# Patient Record
Sex: FEMALE | Race: BLACK OR AFRICAN AMERICAN | NOT HISPANIC OR LATINO | URBAN - METROPOLITAN AREA
[De-identification: names, ages, dates, MRNs, and addresses within clinical notes are randomized per-mention and may not be internally consistent; named-entity substitution may affect disease eponyms.]

---

## 2023-11-26 ENCOUNTER — APPOINTMENT (OUTPATIENT)
Dept: RADIOLOGY | Facility: HOSPITAL | Age: 48
End: 2023-11-26
Payer: COMMERCIAL

## 2023-11-26 ENCOUNTER — HOSPITAL ENCOUNTER (EMERGENCY)
Facility: HOSPITAL | Age: 48
Discharge: HOME | End: 2023-11-26
Attending: INTERNAL MEDICINE
Payer: COMMERCIAL

## 2023-11-26 VITALS
HEIGHT: 66 IN | OXYGEN SATURATION: 97 % | BODY MASS INDEX: 34.55 KG/M2 | SYSTOLIC BLOOD PRESSURE: 138 MMHG | WEIGHT: 215 LBS | RESPIRATION RATE: 18 BRPM | DIASTOLIC BLOOD PRESSURE: 74 MMHG | TEMPERATURE: 98.2 F | HEART RATE: 74 BPM

## 2023-11-26 DIAGNOSIS — K29.70 GASTRITIS WITHOUT BLEEDING, UNSPECIFIED CHRONICITY, UNSPECIFIED GASTRITIS TYPE: Primary | ICD-10-CM

## 2023-11-26 LAB
ALBUMIN SERPL BCP-MCNC: 3.6 G/DL (ref 3.4–5)
ALP SERPL-CCNC: 97 U/L (ref 33–110)
ALT SERPL W P-5'-P-CCNC: 30 U/L (ref 7–45)
ANION GAP SERPL CALC-SCNC: 8 MMOL/L (ref 10–20)
APPEARANCE UR: CLEAR
AST SERPL W P-5'-P-CCNC: 14 U/L (ref 9–39)
BASOPHILS # BLD AUTO: 0.05 X10*3/UL (ref 0–0.1)
BASOPHILS NFR BLD AUTO: 0.6 %
BILIRUB SERPL-MCNC: 0.4 MG/DL (ref 0–1.2)
BILIRUB UR STRIP.AUTO-MCNC: NEGATIVE MG/DL
BUN SERPL-MCNC: 23 MG/DL (ref 6–23)
CALCIUM SERPL-MCNC: 8 MG/DL (ref 8.6–10.3)
CHLORIDE SERPL-SCNC: 105 MMOL/L (ref 98–107)
CO2 SERPL-SCNC: 27 MMOL/L (ref 21–32)
COLOR UR: YELLOW
CREAT SERPL-MCNC: 1 MG/DL (ref 0.5–1.05)
EOSINOPHIL # BLD AUTO: 0.07 X10*3/UL (ref 0–0.7)
EOSINOPHIL NFR BLD AUTO: 0.8 %
ERYTHROCYTE [DISTWIDTH] IN BLOOD BY AUTOMATED COUNT: 13.9 % (ref 11.5–14.5)
GFR SERPL CREATININE-BSD FRML MDRD: 70 ML/MIN/1.73M*2
GLUCOSE SERPL-MCNC: 78 MG/DL (ref 74–99)
GLUCOSE UR STRIP.AUTO-MCNC: NEGATIVE MG/DL
HCG UR QL IA.RAPID: NEGATIVE
HCT VFR BLD AUTO: 40.2 % (ref 36–46)
HGB BLD-MCNC: 13.2 G/DL (ref 12–16)
HOLD SPECIMEN: NORMAL
IMM GRANULOCYTES # BLD AUTO: 0.03 X10*3/UL (ref 0–0.7)
IMM GRANULOCYTES NFR BLD AUTO: 0.3 % (ref 0–0.9)
KETONES UR STRIP.AUTO-MCNC: NEGATIVE MG/DL
LACTATE SERPL-SCNC: 0.6 MMOL/L (ref 0.4–2)
LEUKOCYTE ESTERASE UR QL STRIP.AUTO: NEGATIVE
LIPASE SERPL-CCNC: 21 U/L (ref 9–82)
LYMPHOCYTES # BLD AUTO: 3.63 X10*3/UL (ref 1.2–4.8)
LYMPHOCYTES NFR BLD AUTO: 40 %
MCH RBC QN AUTO: 30 PG (ref 26–34)
MCHC RBC AUTO-ENTMCNC: 32.8 G/DL (ref 32–36)
MCV RBC AUTO: 91 FL (ref 80–100)
MONOCYTES # BLD AUTO: 0.52 X10*3/UL (ref 0.1–1)
MONOCYTES NFR BLD AUTO: 5.7 %
NEUTROPHILS # BLD AUTO: 4.78 X10*3/UL (ref 1.2–7.7)
NEUTROPHILS NFR BLD AUTO: 52.6 %
NITRITE UR QL STRIP.AUTO: NEGATIVE
NRBC BLD-RTO: 0 /100 WBCS (ref 0–0)
PH UR STRIP.AUTO: 5 [PH]
PLATELET # BLD AUTO: 373 X10*3/UL (ref 150–450)
POTASSIUM SERPL-SCNC: 3.2 MMOL/L (ref 3.5–5.3)
PROT SERPL-MCNC: 5.9 G/DL (ref 6.4–8.2)
PROT UR STRIP.AUTO-MCNC: NEGATIVE MG/DL
RBC # BLD AUTO: 4.4 X10*6/UL (ref 4–5.2)
RBC # UR STRIP.AUTO: NEGATIVE /UL
SARS-COV-2 RNA RESP QL NAA+PROBE: NOT DETECTED
SODIUM SERPL-SCNC: 137 MMOL/L (ref 136–145)
SP GR UR STRIP.AUTO: 1.02
UROBILINOGEN UR STRIP.AUTO-MCNC: 2 MG/DL
WBC # BLD AUTO: 9.1 X10*3/UL (ref 4.4–11.3)

## 2023-11-26 PROCEDURE — 81003 URINALYSIS AUTO W/O SCOPE: CPT | Performed by: INTERNAL MEDICINE

## 2023-11-26 PROCEDURE — 96375 TX/PRO/DX INJ NEW DRUG ADDON: CPT

## 2023-11-26 PROCEDURE — 74176 CT ABD & PELVIS W/O CONTRAST: CPT | Mod: FR

## 2023-11-26 PROCEDURE — 2500000001 HC RX 250 WO HCPCS SELF ADMINISTERED DRUGS (ALT 637 FOR MEDICARE OP): Performed by: INTERNAL MEDICINE

## 2023-11-26 PROCEDURE — 96374 THER/PROPH/DIAG INJ IV PUSH: CPT

## 2023-11-26 PROCEDURE — 96376 TX/PRO/DX INJ SAME DRUG ADON: CPT

## 2023-11-26 PROCEDURE — 80053 COMPREHEN METABOLIC PANEL: CPT | Performed by: INTERNAL MEDICINE

## 2023-11-26 PROCEDURE — 85025 COMPLETE CBC W/AUTO DIFF WBC: CPT | Performed by: INTERNAL MEDICINE

## 2023-11-26 PROCEDURE — 87635 SARS-COV-2 COVID-19 AMP PRB: CPT | Performed by: INTERNAL MEDICINE

## 2023-11-26 PROCEDURE — 83690 ASSAY OF LIPASE: CPT | Performed by: INTERNAL MEDICINE

## 2023-11-26 PROCEDURE — 36415 COLL VENOUS BLD VENIPUNCTURE: CPT | Performed by: INTERNAL MEDICINE

## 2023-11-26 PROCEDURE — 2500000004 HC RX 250 GENERAL PHARMACY W/ HCPCS (ALT 636 FOR OP/ED): Performed by: INTERNAL MEDICINE

## 2023-11-26 PROCEDURE — 74176 CT ABD & PELVIS W/O CONTRAST: CPT | Mod: FOREIGN READ | Performed by: RADIOLOGY

## 2023-11-26 PROCEDURE — 2500000004 HC RX 250 GENERAL PHARMACY W/ HCPCS (ALT 636 FOR OP/ED)

## 2023-11-26 PROCEDURE — 81025 URINE PREGNANCY TEST: CPT | Performed by: INTERNAL MEDICINE

## 2023-11-26 PROCEDURE — 83605 ASSAY OF LACTIC ACID: CPT | Performed by: INTERNAL MEDICINE

## 2023-11-26 PROCEDURE — 99284 EMERGENCY DEPT VISIT MOD MDM: CPT | Mod: 25 | Performed by: INTERNAL MEDICINE

## 2023-11-26 RX ORDER — ONDANSETRON 4 MG/1
4 TABLET, ORALLY DISINTEGRATING ORAL EVERY 8 HOURS PRN
Qty: 15 TABLET | Refills: 0 | Status: SHIPPED | OUTPATIENT
Start: 2023-11-26

## 2023-11-26 RX ORDER — MORPHINE SULFATE 4 MG/ML
4 INJECTION, SOLUTION INTRAMUSCULAR; INTRAVENOUS ONCE
Status: COMPLETED | OUTPATIENT
Start: 2023-11-26 | End: 2023-11-26

## 2023-11-26 RX ORDER — ONDANSETRON HYDROCHLORIDE 2 MG/ML
4 INJECTION, SOLUTION INTRAVENOUS ONCE
Status: COMPLETED | OUTPATIENT
Start: 2023-11-26 | End: 2023-11-26

## 2023-11-26 RX ORDER — FAMOTIDINE 20 MG/1
20 TABLET, FILM COATED ORAL 2 TIMES DAILY PRN
Qty: 30 TABLET | Refills: 0 | Status: SHIPPED | OUTPATIENT
Start: 2023-11-26 | End: 2023-12-11

## 2023-11-26 RX ORDER — DICYCLOMINE HYDROCHLORIDE 20 MG/1
20 TABLET ORAL 2 TIMES DAILY PRN
Qty: 20 TABLET | Refills: 0 | Status: SHIPPED | OUTPATIENT
Start: 2023-11-26 | End: 2023-12-06

## 2023-11-26 RX ORDER — ONDANSETRON HYDROCHLORIDE 2 MG/ML
INJECTION, SOLUTION INTRAVENOUS
Status: COMPLETED
Start: 2023-11-26 | End: 2023-11-26

## 2023-11-26 RX ORDER — FAMOTIDINE 10 MG/ML
20 INJECTION INTRAVENOUS ONCE
Status: COMPLETED | OUTPATIENT
Start: 2023-11-26 | End: 2023-11-26

## 2023-11-26 RX ORDER — ALUMINUM HYDROXIDE, MAGNESIUM HYDROXIDE, AND SIMETHICONE 1200; 120; 1200 MG/30ML; MG/30ML; MG/30ML
30 SUSPENSION ORAL ONCE
Status: COMPLETED | OUTPATIENT
Start: 2023-11-26 | End: 2023-11-26

## 2023-11-26 RX ORDER — POTASSIUM CHLORIDE 1.5 G/1.58G
40 POWDER, FOR SOLUTION ORAL ONCE
Status: COMPLETED | OUTPATIENT
Start: 2023-11-26 | End: 2023-11-26

## 2023-11-26 RX ADMIN — ONDANSETRON HYDROCHLORIDE 4 MG: 2 INJECTION, SOLUTION INTRAVENOUS at 21:17

## 2023-11-26 RX ADMIN — MORPHINE SULFATE 4 MG: 4 INJECTION, SOLUTION INTRAMUSCULAR; INTRAVENOUS at 17:42

## 2023-11-26 RX ADMIN — ONDANSETRON 4 MG: 2 INJECTION INTRAMUSCULAR; INTRAVENOUS at 17:13

## 2023-11-26 RX ADMIN — MORPHINE SULFATE 4 MG: 4 INJECTION, SOLUTION INTRAMUSCULAR; INTRAVENOUS at 20:04

## 2023-11-26 RX ADMIN — FAMOTIDINE 20 MG: 10 INJECTION, SOLUTION INTRAVENOUS at 17:12

## 2023-11-26 RX ADMIN — ONDANSETRON 4 MG: 2 INJECTION INTRAMUSCULAR; INTRAVENOUS at 21:17

## 2023-11-26 RX ADMIN — ALUMINUM HYDROXIDE, MAGNESIUM HYDROXIDE, AND DIMETHICONE 30 ML: 200; 20; 200 SUSPENSION ORAL at 17:12

## 2023-11-26 RX ADMIN — POTASSIUM CHLORIDE 40 MEQ: 1.5 POWDER, FOR SOLUTION ORAL at 17:41

## 2023-11-26 ASSESSMENT — PAIN DESCRIPTION - LOCATION: LOCATION: ABDOMEN

## 2023-11-26 ASSESSMENT — COLUMBIA-SUICIDE SEVERITY RATING SCALE - C-SSRS
2. HAVE YOU ACTUALLY HAD ANY THOUGHTS OF KILLING YOURSELF?: NO
6. HAVE YOU EVER DONE ANYTHING, STARTED TO DO ANYTHING, OR PREPARED TO DO ANYTHING TO END YOUR LIFE?: NO
1. IN THE PAST MONTH, HAVE YOU WISHED YOU WERE DEAD OR WISHED YOU COULD GO TO SLEEP AND NOT WAKE UP?: NO

## 2023-11-26 ASSESSMENT — PAIN SCALES - GENERAL
PAINLEVEL_OUTOF10: 10 - WORST POSSIBLE PAIN
PAINLEVEL_OUTOF10: 9
PAINLEVEL_OUTOF10: 8

## 2023-11-26 ASSESSMENT — PAIN - FUNCTIONAL ASSESSMENT
PAIN_FUNCTIONAL_ASSESSMENT: 0-10
PAIN_FUNCTIONAL_ASSESSMENT: 0-10

## 2023-11-26 NOTE — ED TRIAGE NOTES
"Pt arrived to triage for abdominal pain that started earlier today. Pt sts n w/o v. Pt denies constipation of d. Pt describes the pain as \"intestines tangled\". No obvious signs of distress noted at this time. VSS at this time. EKG complete in triage.   "

## 2023-11-26 NOTE — ED PROVIDER NOTES
"HPI     CC: Abdominal Pain     HPI: Kamini Garza is a 48 y.o. female with a history of Jaqueline-en-Y gastric bypass, cholecystectomy, asthma, presents with upper abdominal pain.  Symptoms started today.  She feels a knot-like pain in her left upper quadrant that then shoots to the right upper quadrant.  She had some associated nausea but no vomiting.  Symptoms started after she ate.  She does take a daily PPI but has not tried any other pain meds.  She denies any lower abdominal pain, diarrhea, dysuria or hematuria.  Last bowel movement was this morning and it was normal.    ROS: 10-point review of systems was performed and is otherwise negative except as noted in HPI.    Limitations to history: N/A    Independent Historians: N/A    External Records Reviewed: N/A    Past Medical History: Noncontributory except per HPI     Past Surgical History: Noncontributory except per HPI     Family History: Reviewed and noncontributory     Social History:  Denies tobacco. Denies ETOH. Denies illicit drugs.    Social Determinants Affecting Care: N/A    Allergies   Allergen Reactions    Iodinated Contrast Media Anaphylaxis    Percolone [Oxycodone] Hives    Sudafed [Pseudoephedrine Hcl] Hives    Tramadol Hives       Home Meds:   None documented     Physical Exam     ED Triage Vitals [11/26/23 1622]   Temp Heart Rate Resp BP   36.8 °C (98.2 °F) 85 17 (!) 148/92      SpO2 Temp Source Heart Rate Source Patient Position   100 % Temporal -- Sitting      BP Location FiO2 (%)     Right arm --         Heart Rate:  [74-85]   Temp:  [36.8 °C (98.2 °F)]   Resp:  [15-17]   BP: (146-154)/(85-92)   Height:  [167.6 cm (5' 6\")]   Weight:  [97.5 kg (215 lb)]   SpO2:  [96 %-100 %]      Physical Exam  Vitals and nursing note reviewed.     CONSTITUTIONAL: Well appearing, well nourished, in no acute distress.   HENMT: Head atraumatic. Airway patent. Nasal mucosa clear. Mouth with normal mucosa, clear oropharynx. Uvula midline. Neck supple.    EYES: Clear " bilaterally, pupils equally round and reactive to light.   CARDIOVASCULAR: Normal rate, regular rhythm.  Heart sounds S1, S2.  No murmurs, rubs or gallops. Normal pulses. Capillary refill < 2 sec.   RESPIRATORY: No increased work of breathing. Breath sounds clear and equal bilaterally.  GASTROINTESTINAL: Abdomen soft, non-distended, +tender to palpation in the LUQ and RUQ with voluntary guarding. No rebound. Normal bowel sounds. No palpable masses.  GENITOURINARY:  No CVA tenderness.  MUSCULOSKELETAL: Spine appears normal, range of motion is not limited, no muscle or joint tenderness. No edema.   NEUROLOGICAL: Alert and oriented, no asymmetry, moving all extremities equally.  SKIN: Warm, dry and intact. No rash or notable lesions.  PSYCHIATRIC: Normal mood and affect.  HEME/LYMPH: No adenopathy or splenomegaly.    Diagnostic Results      ECG: ECGs read and interpreted by me. See ED Course, below, for interpretation.    Labs Reviewed   COMPREHENSIVE METABOLIC PANEL - Abnormal       Result Value    Glucose 78      Sodium 137      Potassium 3.2 (*)     Chloride 105      Bicarbonate 27      Anion Gap 8 (*)     Urea Nitrogen 23      Creatinine 1.00      eGFR 70      Calcium 8.0 (*)     Albumin 3.6      Alkaline Phosphatase 97      Total Protein 5.9 (*)     AST 14      Bilirubin, Total 0.4      ALT 30     URINALYSIS WITH REFLEX MICROSCOPIC AND CULTURE - Abnormal    Color, Urine Yellow      Appearance, Urine Clear      Specific Gravity, Urine 1.017      pH, Urine 5.0      Protein, Urine NEGATIVE      Glucose, Urine NEGATIVE      Blood, Urine NEGATIVE      Ketones, Urine NEGATIVE      Bilirubin, Urine NEGATIVE      Urobilinogen, Urine 2.0 (*)     Nitrite, Urine NEGATIVE      Leukocyte Esterase, Urine NEGATIVE     LIPASE - Normal    Lipase 21      Narrative:     Venipuncture immediately after or during the administration of Metamizole may lead to falsely low results. Testing should be performed immediately prior to  Metamizole dosing.   LACTATE - Normal    Lactate 0.6      Narrative:     Venipuncture immediately after or during the administration of Metamizole may lead to falsely low results. Testing should be performed immediately  prior to Metamizole dosing.   SARS-COV-2 PCR, SYMPTOMATIC - Normal    Coronavirus 2019, PCR Not Detected      Narrative:     This assay has received FDA Emergency Use Authorization (EUA) and is only authorized for the duration of time that circumstances exist to justify the authorization of the emergency use of in vitro diagnostic tests for the detection of SARS-CoV-2 virus and/or diagnosis of COVID-19 infection under section 564(b)(1) of the Act, 21 U.S.C. 360bbb-3(b)(1). This assay is an in vitro diagnostic nucleic acid amplification test for the qualitative detection of SARS-CoV-2 from nasopharyngeal specimens and has been validated for use at [facility]. Negative results do not preclude COVID-19 infections and should not be used as the sole basis for diagnosis, treatment, or other management decisions.     HCG, URINE, QUALITATIVE - Normal    HCG, Urine NEGATIVE     CBC WITH AUTO DIFFERENTIAL    WBC 9.1      nRBC 0.0      RBC 4.40      Hemoglobin 13.2      Hematocrit 40.2      MCV 91      MCH 30.0      MCHC 32.8      RDW 13.9      Platelets 373      Neutrophils % 52.6      Immature Granulocytes %, Automated 0.3      Lymphocytes % 40.0      Monocytes % 5.7      Eosinophils % 0.8      Basophils % 0.6      Neutrophils Absolute 4.78      Immature Granulocytes Absolute, Automated 0.03      Lymphocytes Absolute 3.63      Monocytes Absolute 0.52      Eosinophils Absolute 0.07      Basophils Absolute 0.05     URINALYSIS WITH REFLEX MICROSCOPIC AND CULTURE    Narrative:     The following orders were created for panel order Urinalysis with Reflex Microscopic and Culture.  Procedure                               Abnormality         Status                     ---------                                -----------         ------                     Urinalysis with Reflex Mi...[[id number]]  Abnormal            Final result               Extra Urine Gray Tube[[id number]]                             Final result                 Please view results for these tests on the individual orders.   EXTRA URINE GRAY TUBE    Extra Tube Hold for add-ons.           CT abdomen pelvis wo IV contrast   Final Result   No acute process identified involving the abdomen or pelvis.   Signed by Jefry Pearson MD                    Bianca Coma Scale Score: 15                  Procedure  Procedures    ED Course & MDM   Assessment/Plan:   Kamini Garza is a 48 y.o. female with a history of Jaqueline-en-Y gastric bypass, cholecystectomy, asthma, presents with upper abdominal pain.  She is focally tender to palpation in the left and right upper quadrants.  She has some associated nausea but no vomiting.  She is having normal bowel movements today.  She is hemodynamically stable except for mild hypertension.  Differential includes bowel obstruction given history of gastric bypass, gastritis/PUD given location in LUQ and onset after eating, hepatitis, pancreatitis or other occult intra-abdominal process.  Patient is s/p cholecystectomy ruling out acute cholecystitis.  No lower abdominal pain to suggest appendicitis, UTI, or acute pelvic process.  Work-up was initiated with labs, UA, CTAP.  Treatment was initiated with IV Zofran, Pepcid and Maalox. See below for details of ED course and ultimate disposition.    Medications   famotidine PF (Pepcid) injection 20 mg (20 mg intravenous Given 11/26/23 1712)   alum-mag hydroxide-simeth (Mylanta) 200-200-20 mg/5 mL oral suspension 30 mL (30 mL oral Given 11/26/23 1712)   ondansetron (Zofran) injection 4 mg (4 mg intravenous Given 11/26/23 1713)   morphine injection 4 mg (4 mg intravenous Given 11/26/23 1742)   potassium chloride (Klor-Con) packet 40 mEq (40 mEq oral Given 11/26/23 1741)    morphine injection 4 mg (4 mg intravenous Given 11/26/23 2004)        ED Course as of 11/26/23 2108   [place] Nov 26, 2023   1758 Labs notable for normal CBC, CMP with mild hypokalemia 3.2 otherwise unremarkable, normal lipase, normal lactate, negative beta hCG, negative COVID swab.  UA negative for UTI.  Potassium was repleted with p.o. KCl.  Morphine given for pain. [CG]   1808 UA unremarkable [CG]   2100 ECG: ECG read and interpreted by me. Normal sinus rhythm, rate 78. Normal axis. Normal intervals. No ST or T wave abnormalities. [CG]   2100 CTAP IMPRESSION:  No acute process identified involving the abdomen or pelvis.   [CG]   2100 Patient feeling better.  Tolerating p.o.  Will discharge home with Zofran, Bentyl, and Pepcid.  Patient was discharged home with anticipatory guidance and strict return precautions.  She was given the number for GI to call for possible EGD if symptoms recur. [CG]      ED Course User Index  [CG] Raine Barraza MD         Diagnoses as of 11/26/23 2108   Gastritis without bleeding, unspecified chronicity, unspecified gastritis type       Disposition:   DISCHARGE.  The patient was discharged with both verbal and written anticipatory guidance and strict return precautions. Discharge diagnosis, instructions and plan were discussed and understood. I emphasized the importance of following up with their primary care provider within 24-48 hours and with any referrals in the timeframe recommended. At the time of discharge the patient was comfortable and was in no apparent distress. Patient is aware of diagnostic uncertainty and was notified though testing is negative here, there is a very small chance that pathology may be missed.  The patient understands these risks and the patient/family understood to call 911 or return immediately to the emergency department if the symptoms worsen or if they have any additional concerns.      FOLLOW UP  Primary care provider in 1-2 days.      ED  Prescriptions       Medication Sig Dispense Start Date End Date Auth. Provider    famotidine (Pepcid) 20 mg tablet Take 1 tablet (20 mg) by mouth 2 times a day as needed (abdominal pain) for up to 15 days. 30 tablet 11/26/2023 12/11/2023 Raine Barraza MD    dicyclomine (Bentyl) 20 mg tablet Take 1 tablet (20 mg) by mouth 2 times a day as needed (abdominal cramping) for up to 10 days. 20 tablet 11/26/2023 12/6/2023 Raine Barraza MD    ondansetron ODT (Zofran-ODT) 4 mg disintegrating tablet Take 1 tablet (4 mg) by mouth every 8 hours if needed for nausea or vomiting. 15 tablet 11/26/2023 -- MD Raine Albert MD  EM/IM/Peds    This note was dictated by speech recognition. Minor errors in transcription may be present.     Raine Barraza MD  11/26/23 1539

## 2023-11-27 NOTE — DISCHARGE INSTRUCTIONS
You were seen today for upper abdominal pain.  You may have gastritis.  Your evaluation was not concerning for an emergency at this time.  We gave you a prescription for an antispasmodic, and antinausea medicine, and an antacid.  Continue your omeprazole at home.  If your symptoms recur, consider seeing a GI specialist or returning to the ED.  Please see the attached information sheet for information about your condition, how to care for your condition at home, and reasons to return to the emergency department. Take any prescriptions written today as prescribed. You should call your primary care provider within 24 hours to tell them about today's visit, including any new medications or medication changes, as he or she may want to see you in the office for further evaluation. If you do not have a primary care provider, call  [phone] for an appointment. We offer in-person office visits as well as virtual options. Please do not hesitate to call  [phone] or return to the emergency department with any new or unresolved concerns or symptoms. Thank you for choosing [facility] for your care.

## 2024-01-19 NOTE — PROGRESS NOTES
BARIATRIC SURGERY CLINIC  1 YEAR/ANNUAL FOLLOW UP NOTE    Clinic Date:  1/24/24    Kamini Garza, 48 y.o.  MRN: [id number]    Date of Surgery: 10/3/17   Surgical Procedure: Laparoscopic lauro en y gastric bypass 83275  Extra Procedures: None    Date of Surgery: 1/9/2020  Surgical Procedure: Laparoscopic redo hiatal hernia repair and revision of  gastrojejunostomy and upper endoscopy.    Initial weight: 272 lb  Pre-surgical weight: 209 lb  Today's Visit:   Wt Readings from Last 1 Encounters:   11/26/23 97.5 kg (215 lb)    There is no height or weight on file to calculate BMI.   Total weight loss: at 228  Stomach been hurting lately.  Started in November.  She has been dealing with it.  She is taking omeprazole. Notes the pain in ruq.  Takes tylenol.   She likes eating snacks and chips. Chips don't change it.     Surgeon: Miladys Hook MD      PROVIDER IMPRESSIONS LAST FUV 10/2020: 46 yo female 3 years post rygbp and had prior lala. Se also had HH repair. She was admitted with abd pain and elevated transaminases and high alk phos. This was suggestive of biliary disease, but ct, mrcp and u/s all normal.     CT Abdomen/Pelvis 11/26/23:   INDICATION: Upper abdominal pain.  LOWER CHEST:  No cardiomegaly.  No pericardial effusion.  Lung bases are clear.  ABDOMEN:  LIVER:  No hepatomegaly.  Smooth surface contour.  Normal attenuation.  BILE DUCTS:  No intrahepatic or extrahepatic biliary ductal dilatation.  GALLBLADDER:  The gallbladder is surgically absent.  STOMACH:  No abnormalities identified.  PANCREAS:  No masses or ductal dilatation.  SPLEEN:  No splenomegaly or focal splenic lesion.  ADRENAL GLANDS:  No thickening or nodules.  KIDNEYS AND URETERS:  Kidneys are normal in size and location.  No renal or ureteral  calculi.  PELVIS:  BLADDER:  No abnormalities identified.  REPRODUCTIVE ORGANS:  No abnormalities identified.  BOWEL:  Changes of Lauro-en-Y gastric bypass. Otherwise unremarkable.  VESSELS:  No abnormalities  identified.  Abdominal aorta is normal in caliber.   PERITONEUM/RETROPERITONEUM/LYMPH NODES:  No free fluid.  No pneumoperitoneum.  No lymphadenopathy.  ABDOMINAL WALL:  No abnormalities identified.  SOFT TISSUES:   No abnormalities identified.  BONES:  No acute fracture or aggressive osseous lesion.  IMPRESSION:  No acute process identified involving the abdomen or pelvis.      PRESENTING TODAY FOR BARIATRIC POST-OP VISIT:  Visit: 7  years   -  Self Reports Concerns:  nausea and vomiting, abdominal pain.  Symptoms:    Abdominal pain:  Yes            Constipation: No    Diarrhea: No    Dumping Syndrome:  No    Experiencing hunger: Yes    Food Intolerances: No    Nausea/vomiting: Yes    Reflux: Yes   Are you on medications: Yes      Diet History:     Carbonated Beverages: No          Caffeinated Beverages: Yes    Time to eat meals: all day, only eats small portions, snacking    Fluid intake: 50-60 oz/day      Protein Intake:  50 grams/day    Breakfast: eggs, helm    Lunch: salad or half sandwich    Dinner: salmon, vegetables    Snacks: cookies, chips.    EXERCISE:  Yes- walking          GERD - Health Related Quality of Life Questionnaire (GERD- HRQL)  On PPI    How bad is the heartburn? 0 = No symptoms  Heartburn when lying down? 0 = No symptoms  Heartburn when standing up? 0 = No symptoms  Heartburn after meals? 0 = No symptoms  Does heartburn change your diet? 0 = No symptoms  Does heartburn wake you from sleep? 0 = No symptoms    Do you have difficulty swallowing? 0 = No symptoms  Do you have pain with swallowing? 0 = No symptoms  If you take medication, does this affect your daily life? 0 = No symptoms    How bad is the regurgitation? 3 = Symptoms bothersome every day  Regurgitation when lying down? 3 = Symptoms bothersome every day  Regurgitation when standing up? 1 = Symptoms noticeable but not bothersome  Regurgitation after meals? 1 = Symptoms noticeable but not bothersome  Does regurgitation change your  diet? 0 = No symptoms  Does regurgitation wake you from sleep? 3 = Symptoms bothersome every day    How satisfied are you with your present condition? Dissatisfied    Total score (calculated by summing the individual scores of questions 1-15): 11   Greatest possible score 75 (worst symptoms).   Lowest possible score 0 (no symptoms).    Heartburn score (calculated by summing the individual scores of questions 1-6): 0   Worst heartburn symptoms: 30.   No heartburn symptoms: 0.   Score less than or equal to 12 with each individual question not exceeding 2 indicate heartburn elimination.    Regurgitation score (calculated by summing the individual scores of questions 10-15): 11   Worst regurgitation symptoms: 30.   No regurgitation symptoms: 0.   Score less than or equal to 12 with each individual question not exceeding 2 indicate regurgitation elimination.     Last Visit:    Wt Readings from Last 3 Encounters:   11/26/23 97.5 kg (215 lb)   07/07/22 104 kg (230 lb)   02/19/20 90 kg (198 lb 7 oz)        Current Outpatient Medications   Medication Sig Dispense Refill    famotidine (Pepcid) 20 mg tablet Take 1 tablet (20 mg) by mouth 2 times a day as needed (abdominal pain) for up to 15 days. 30 tablet 0    ondansetron ODT (Zofran-ODT) 4 mg disintegrating tablet Take 1 tablet (4 mg) by mouth every 8 hours if needed for nausea or vomiting. 15 tablet 0     No current facility-administered medications for this visit.       Comorbidities:  No problem-specific Assessment & Plan notes found for this encounter.      DAILY SUPPLEMENTS:  Calcium: Calcium Citrate w/ vitamin D (1200 - 1500mg)  Multivitamin & Minerals: 2 per day  Iron Supplement: included in multi-vitamin  Vitamin B12: 1000 mcg   Vitamin D3: 3000 units      REVIEW OF SYSTEMS:  CONSTITUTIONAL: Patient denies fevers, chills, sweats and weight changes.  EYES: Patient denies any visual symptoms.  EARS, NOSE, AND THROAT: No difficulties with hearing. No symptoms of  "rhinitis or sore throat.  CARDIOVASCULAR: Patient denies chest pains, palpitations, orthopnea and paroxysmal nocturnal dyspnea.  RESPIRATORY: No dyspnea on exertion, no wheezing or cough.  GI: No nausea, vomiting, diarrhea, constipation, abdominal pain, hematochezia or melena.  : No urinary hesitancy or dribbling. No nocturia or urinary frequency. No abnormal urethral discharge.  MUSCULOSKELETAL: No myalgias or arthralgias.  NEUROLOGIC: No chronic headaches, no seizures. Patient denies numbness, tingling or weakness.  PSYCHIATRIC: Patient denies problems with mood disturbance. No problems with anxiety.  ENDOCRINE: No excessive urination or excessive thirst.  DERMATOLOGIC: Patient denies any rashes or skin changes.    PHYSICAL EXAM:  /90   Pulse 94   Ht 1.676 m (5' 6\")   Wt 103 kg (228 lb)   BMI 36.80 kg/m²  There is no height or weight on file to calculate BMI.  GENERAL: Obese. No apparent distress. Pt is alert and oriented x3.  HEENT: Head is normocephalic and atraumatic. Extraocular muscles are intact. Pupils are equal, round, and reactive to light and accommodation. Nares appeared normal. Mouth is well hydrated and without lesions. Mucous membranes are moist. Posterior pharynx clear of any exudate or lesions.  NECK: Supple. No carotid bruits. No lymphadenopathy or thyromegaly.  LUNGS: Clear to auscultation.  HEART: Regular rate and rhythm without murmur.  ABDOMEN: Soft, nontender, and nondistended. Positive bowel sounds. Liver tender and firm  EXTREMITIES: Without any cyanosis, clubbing, rash, lesions or edema.  NEUROLOGIC: Cranial nerves II through XII are grossly intact.  PSYCHIATRIC: Flat affect, but denies suicidal or homicidal ideations.  SKIN: No ulceration or induration present.      TODAY'S PROVIDER VISIT IMPRESSIONS: 47 yo female 6 years post rygbp and had prior lala. Se also had HH repair.  She comes in with recurrent right upper quadrant pain.  She is very tender to palpation.  Exam she is " tender over her liver.  She admits to drinking alcohol and she is snacking a lot.  She is not eating the proper foods.  I have advised her to stop drinking alcohol and follow the pouch rules and we will reassess in a month.  We will also check some blood work     A/P: Normal post-OP course  Cut out alcohol  Eat clean  No rice, bread or pasta  No chips  Meal prep and eat protein and veggies.   See me in a month  Follow the pouch rules:    - - Eat 5 small meals per day (3 meals and 2 snacks)  - Take in at least 60 g protein daily (try to get through lean meats, dairy, legumes)  - Eat 5 vegetables per day  - No sweets, fruits are fine.  Berries and citrus are lower glycemic index fruits  -Limit rice, bread, pasta and potatoes.  These should only be consumed after having your protein and vegetables  - Drink at least 60 oz fluid daily, (non caffeinated, no carbonated beverages, sugar free)  - Get 60 minutes of exercise daily     Continue the omeprazole for now.     Please have your yearly lab work done (if you have not already) - We will call you with any abnormal lab results.    Be sure to continue with exercise, goal should be 3-5 times a week 60 minutes at a time.    Increase variety and intensity of your work out routine.    Make sure you are taking your multivitamin, B12 and calcium.    See the Dietician as needed.    Dr. Miladys Hook M.D., MPH  Director of Bariatric & Minimally Invasive Surgery  [place] [address]  T:  [phone]  F:  [phone]     Mily Esparza, RN Assistant Nurse Manager, Care Coordinator Patient Nursing Contact  T: [phone]  F: [phone]    Kristen Burks, Patient Navigator- Bariatric Surgery [facility] Patient Clearance Questions & Tracking Contact  T: [phone] F: [phone]

## 2024-01-24 ENCOUNTER — APPOINTMENT (OUTPATIENT)
Dept: LAB | Facility: LAB | Age: 49
End: 2024-01-24
Payer: COMMERCIAL

## 2024-01-24 ENCOUNTER — OFFICE VISIT (OUTPATIENT)
Dept: SURGERY | Facility: CLINIC | Age: 49
End: 2024-01-24
Payer: COMMERCIAL

## 2024-01-24 VITALS
HEIGHT: 66 IN | SYSTOLIC BLOOD PRESSURE: 157 MMHG | DIASTOLIC BLOOD PRESSURE: 90 MMHG | BODY MASS INDEX: 36.64 KG/M2 | HEART RATE: 94 BPM | WEIGHT: 228 LBS

## 2024-01-24 DIAGNOSIS — Z98.84 BARIATRIC SURGERY STATUS: ICD-10-CM

## 2024-01-24 DIAGNOSIS — Z98.84 STATUS POST BARIATRIC SURGERY: ICD-10-CM

## 2024-01-24 DIAGNOSIS — E66.01 MORBID OBESITY (MULTI): ICD-10-CM

## 2024-01-24 DIAGNOSIS — Z13.21 ENCOUNTER FOR VITAMIN DEFICIENCY SCREENING: ICD-10-CM

## 2024-01-24 DIAGNOSIS — Z01.818 PREOPERATIVE CLEARANCE: ICD-10-CM

## 2024-01-24 PROCEDURE — 99214 OFFICE O/P EST MOD 30 MIN: CPT | Performed by: SURGERY

## 2024-01-24 PROCEDURE — 3008F BODY MASS INDEX DOCD: CPT | Performed by: SURGERY

## 2024-01-24 RX ORDER — CYANOCOBALAMIN (VITAMIN B-12) 250 MCG
250 TABLET ORAL DAILY
COMMUNITY

## 2024-01-24 RX ORDER — BISMUTH SUBSALICYLATE 262 MG
1 TABLET,CHEWABLE ORAL DAILY
COMMUNITY

## 2024-01-24 NOTE — PATIENT INSTRUCTIONS
A/P: Normal post-OP course  Cut out alcohol  Eat clean  No rice, bread or pasta  No chips  Meal prep and eat protein and veggies.   See me in a month  Follow the pouch rules:    - - Eat 5 small meals per day (3 meals and 2 snacks)  - Take in at least 60 g protein daily (try to get through lean meats, dairy, legumes)  - Eat 5 vegetables per day  - No sweets, fruits are fine.  Berries and citrus are lower glycemic index fruits  -Limit rice, bread, pasta and potatoes.  These should only be consumed after having your protein and vegetables  - Drink at least 60 oz fluid daily, (non caffeinated, no carbonated beverages, sugar free)  - Get 60 minutes of exercise daily     Continue the omeprazole for now.     Please have your yearly lab work done (if you have not already) - We will call you with any abnormal lab results.    Be sure to continue with exercise, goal should be 3-5 times a week 60 minutes at a time.    Increase variety and intensity of your work out routine.    Make sure you are taking your multivitamin, B12 and calcium.    See the Dietician as needed.    Dr. Miladys Hook M.D., MPH  Director of Bariatric & Minimally Invasive Surgery  [place] [address]  T:  [phone]  F:  [phone]     Mily Esparza, RN Assistant Nurse Manager, Care Coordinator Patient Nursing Contact  T: [phone]  F: [phone]    Kristen Burks, Patient Navigator- Bariatric Surgery [facility] Patient Clearance Questions & Tracking Contact  T: [phone] F: [phone]

## 2024-01-26 ENCOUNTER — LAB (OUTPATIENT)
Dept: LAB | Facility: LAB | Age: 49
End: 2024-01-26
Payer: COMMERCIAL

## 2024-01-26 DIAGNOSIS — Z13.21 ENCOUNTER FOR VITAMIN DEFICIENCY SCREENING: ICD-10-CM

## 2024-01-26 DIAGNOSIS — Z01.818 PREOPERATIVE CLEARANCE: ICD-10-CM

## 2024-01-26 DIAGNOSIS — E66.01 MORBID OBESITY (MULTI): ICD-10-CM

## 2024-01-26 DIAGNOSIS — Z98.84 STATUS POST BARIATRIC SURGERY: ICD-10-CM

## 2024-01-26 DIAGNOSIS — Z98.84 BARIATRIC SURGERY STATUS: ICD-10-CM

## 2024-01-26 LAB
25(OH)D3 SERPL-MCNC: 25 NG/ML (ref 30–100)
ALBUMIN SERPL BCP-MCNC: 4.3 G/DL (ref 3.4–5)
ALP SERPL-CCNC: 166 U/L (ref 33–110)
ALT SERPL W P-5'-P-CCNC: 69 U/L (ref 7–45)
ANION GAP SERPL CALC-SCNC: 14 MMOL/L (ref 10–20)
AST SERPL W P-5'-P-CCNC: 165 U/L (ref 9–39)
BASOPHILS # BLD AUTO: 0.02 X10*3/UL (ref 0–0.1)
BASOPHILS NFR BLD AUTO: 0.3 %
BILIRUB SERPL-MCNC: 0.6 MG/DL (ref 0–1.2)
BUN SERPL-MCNC: 17 MG/DL (ref 6–23)
CALCIUM SERPL-MCNC: 9.8 MG/DL (ref 8.6–10.6)
CHLORIDE SERPL-SCNC: 102 MMOL/L (ref 98–107)
CHOLEST SERPL-MCNC: 176 MG/DL (ref 0–199)
CHOLESTEROL/HDL RATIO: 2
CO2 SERPL-SCNC: 24 MMOL/L (ref 21–32)
CREAT SERPL-MCNC: 0.84 MG/DL (ref 0.5–1.05)
EGFRCR SERPLBLD CKD-EPI 2021: 86 ML/MIN/1.73M*2
EOSINOPHIL # BLD AUTO: 0.12 X10*3/UL (ref 0–0.7)
EOSINOPHIL NFR BLD AUTO: 1.8 %
ERYTHROCYTE [DISTWIDTH] IN BLOOD BY AUTOMATED COUNT: 13.6 % (ref 11.5–14.5)
FERRITIN SERPL-MCNC: 59 NG/ML (ref 8–150)
FOLATE SERPL-MCNC: 22.8 NG/ML
GLUCOSE SERPL-MCNC: 58 MG/DL (ref 74–99)
HCT VFR BLD AUTO: 42.9 % (ref 36–46)
HDLC SERPL-MCNC: 86 MG/DL
HGB BLD-MCNC: 14.3 G/DL (ref 12–16)
IMM GRANULOCYTES # BLD AUTO: 0.02 X10*3/UL (ref 0–0.7)
IMM GRANULOCYTES NFR BLD AUTO: 0.3 % (ref 0–0.9)
IRON SATN MFR SERPL: 21 % (ref 25–45)
IRON SERPL-MCNC: 95 UG/DL (ref 35–150)
LDLC SERPL CALC-MCNC: 79 MG/DL
LYMPHOCYTES # BLD AUTO: 2.02 X10*3/UL (ref 1.2–4.8)
LYMPHOCYTES NFR BLD AUTO: 30.7 %
MCH RBC QN AUTO: 30.5 PG (ref 26–34)
MCHC RBC AUTO-ENTMCNC: 33.3 G/DL (ref 32–36)
MCV RBC AUTO: 92 FL (ref 80–100)
MONOCYTES # BLD AUTO: 0.34 X10*3/UL (ref 0.1–1)
MONOCYTES NFR BLD AUTO: 5.2 %
NEUTROPHILS # BLD AUTO: 4.07 X10*3/UL (ref 1.2–7.7)
NEUTROPHILS NFR BLD AUTO: 61.7 %
NON HDL CHOLESTEROL: 90 MG/DL (ref 0–149)
NRBC BLD-RTO: 0 /100 WBCS (ref 0–0)
PLATELET # BLD AUTO: 404 X10*3/UL (ref 150–450)
POTASSIUM SERPL-SCNC: 3.8 MMOL/L (ref 3.5–5.3)
PROT SERPL-MCNC: 7.2 G/DL (ref 6.4–8.2)
PTH-INTACT SERPL-MCNC: 56.9 PG/ML (ref 18.5–88)
RBC # BLD AUTO: 4.69 X10*6/UL (ref 4–5.2)
SODIUM SERPL-SCNC: 136 MMOL/L (ref 136–145)
TIBC SERPL-MCNC: 443 UG/DL (ref 240–445)
TRIGL SERPL-MCNC: 57 MG/DL (ref 0–149)
UIBC SERPL-MCNC: 348 UG/DL (ref 110–370)
VIT B12 SERPL-MCNC: 654 PG/ML (ref 211–911)
VLDL: 11 MG/DL (ref 0–40)
WBC # BLD AUTO: 6.6 X10*3/UL (ref 4.4–11.3)

## 2024-01-26 PROCEDURE — 82306 VITAMIN D 25 HYDROXY: CPT

## 2024-01-26 PROCEDURE — 82525 ASSAY OF COPPER: CPT

## 2024-01-26 PROCEDURE — 82607 VITAMIN B-12: CPT

## 2024-01-26 PROCEDURE — 84630 ASSAY OF ZINC: CPT

## 2024-01-26 PROCEDURE — 83540 ASSAY OF IRON: CPT

## 2024-01-26 PROCEDURE — 80053 COMPREHEN METABOLIC PANEL: CPT

## 2024-01-26 PROCEDURE — 84590 ASSAY OF VITAMIN A: CPT

## 2024-01-26 PROCEDURE — 82746 ASSAY OF FOLIC ACID SERUM: CPT

## 2024-01-26 PROCEDURE — 84425 ASSAY OF VITAMIN B-1: CPT

## 2024-01-26 PROCEDURE — 82728 ASSAY OF FERRITIN: CPT

## 2024-01-26 PROCEDURE — 85025 COMPLETE CBC W/AUTO DIFF WBC: CPT

## 2024-01-26 PROCEDURE — 80061 LIPID PANEL: CPT

## 2024-01-26 PROCEDURE — 83970 ASSAY OF PARATHORMONE: CPT

## 2024-01-26 PROCEDURE — 83550 IRON BINDING TEST: CPT

## 2024-01-26 PROCEDURE — 36415 COLL VENOUS BLD VENIPUNCTURE: CPT

## 2024-01-29 LAB
COPPER SERPL-MCNC: 132.1 UG/DL (ref 80–155)
ZINC SERPL-MCNC: 65.2 UG/DL (ref 60–120)

## 2024-01-30 LAB
ANNOTATION COMMENT IMP: NORMAL
RETINYL PALMITATE SERPL-MCNC: <0.02 MG/L (ref 0–0.1)
VIT A SERPL-MCNC: 0.6 MG/L (ref 0.3–1.2)

## 2024-01-31 LAB — VIT B1 PYROPHOSHATE BLD-SCNC: 103 NMOL/L (ref 70–180)

## 2024-02-14 DIAGNOSIS — R74.01 TRANSAMINITIS: Primary | ICD-10-CM

## 2024-02-14 DIAGNOSIS — E55.9 VITAMIN D DEFICIENCY: Primary | ICD-10-CM

## 2024-02-14 RX ORDER — CHOLECALCIFEROL (VITAMIN D3) 25 MCG
1000 TABLET ORAL DAILY
Qty: 30 TABLET | Refills: 6 | Status: SHIPPED | OUTPATIENT
Start: 2024-02-14 | End: 2024-09-11

## 2025-01-08 SDOH — HEALTH STABILITY: PHYSICAL HEALTH: ON AVERAGE, HOW MANY DAYS PER WEEK DO YOU ENGAGE IN MODERATE TO STRENUOUS EXERCISE (LIKE A BRISK WALK)?: 3 DAYS

## 2025-01-08 SDOH — HEALTH STABILITY: PHYSICAL HEALTH: ON AVERAGE, HOW MANY MINUTES DO YOU ENGAGE IN EXERCISE AT THIS LEVEL?: 30 MIN

## 2025-01-09 ENCOUNTER — TELEPHONE (OUTPATIENT)
Age: 50
End: 2025-01-09

## 2025-01-09 ENCOUNTER — OFFICE VISIT (OUTPATIENT)
Age: 50
End: 2025-01-09
Payer: COMMERCIAL

## 2025-01-09 ENCOUNTER — HOSPITAL ENCOUNTER (OUTPATIENT)
Dept: NON INVASIVE DIAGNOSTICS | Age: 50
Discharge: HOME | End: 2025-01-09
Payer: COMMERCIAL

## 2025-01-09 VITALS
TEMPERATURE: 97.7 F | HEART RATE: 78 BPM | DIASTOLIC BLOOD PRESSURE: 80 MMHG | HEIGHT: 66 IN | SYSTOLIC BLOOD PRESSURE: 122 MMHG | WEIGHT: 197.4 LBS | RESPIRATION RATE: 20 BRPM | OXYGEN SATURATION: 97 % | BODY MASS INDEX: 31.72 KG/M2

## 2025-01-09 DIAGNOSIS — Z76.89 ENCOUNTER TO ESTABLISH CARE WITH NEW DOCTOR: ICD-10-CM

## 2025-01-09 DIAGNOSIS — Z01.818 PREOPERATIVE EXAMINATION: ICD-10-CM

## 2025-01-09 DIAGNOSIS — J45.20 MILD INTERMITTENT ASTHMA WITHOUT COMPLICATION: Primary | ICD-10-CM

## 2025-01-09 DIAGNOSIS — R79.89 LOW VITAMIN D LEVEL: ICD-10-CM

## 2025-01-09 DIAGNOSIS — R74.01 TRANSAMINITIS: ICD-10-CM

## 2025-01-09 LAB
APTT PPP: 28.8 SEC (ref 24.4–36.8)
INR PPP: 0.9
PROTHROMBIN TIME: 12.8 SEC (ref 12.3–14.9)

## 2025-01-09 PROCEDURE — 99204 OFFICE O/P NEW MOD 45 MIN: CPT | Performed by: STUDENT IN AN ORGANIZED HEALTH CARE EDUCATION/TRAINING PROGRAM

## 2025-01-09 PROCEDURE — 93005 ELECTROCARDIOGRAM TRACING: CPT | Performed by: STUDENT IN AN ORGANIZED HEALTH CARE EDUCATION/TRAINING PROGRAM

## 2025-01-09 RX ORDER — ALBUTEROL SULFATE 90 UG/1
2 INHALANT RESPIRATORY (INHALATION) EVERY 4 HOURS PRN
COMMUNITY
Start: 2025-01-02

## 2025-01-09 RX ORDER — FLUTICASONE PROPIONATE AND SALMETEROL 250; 50 UG/1; UG/1
250 POWDER RESPIRATORY (INHALATION) 2 TIMES DAILY
COMMUNITY
Start: 2025-01-02

## 2025-01-09 RX ORDER — ALBUTEROL SULFATE 0.83 MG/ML
2.5 SOLUTION RESPIRATORY (INHALATION) EVERY 6 HOURS PRN
COMMUNITY
Start: 2025-01-02

## 2025-01-09 RX ORDER — MULTIVITAMIN
1 TABLET ORAL DAILY
COMMUNITY

## 2025-01-09 SDOH — ECONOMIC STABILITY: FOOD INSECURITY: WITHIN THE PAST 12 MONTHS, THE FOOD YOU BOUGHT JUST DIDN'T LAST AND YOU DIDN'T HAVE MONEY TO GET MORE.: NEVER TRUE

## 2025-01-09 SDOH — ECONOMIC STABILITY: FOOD INSECURITY: WITHIN THE PAST 12 MONTHS, YOU WORRIED THAT YOUR FOOD WOULD RUN OUT BEFORE YOU GOT MONEY TO BUY MORE.: NEVER TRUE

## 2025-01-09 ASSESSMENT — PATIENT HEALTH QUESTIONNAIRE - PHQ9
2. FEELING DOWN, DEPRESSED OR HOPELESS: NOT AT ALL
SUM OF ALL RESPONSES TO PHQ QUESTIONS 1-9: 0
1. LITTLE INTEREST OR PLEASURE IN DOING THINGS: NOT AT ALL
SUM OF ALL RESPONSES TO PHQ9 QUESTIONS 1 & 2: 0
SUM OF ALL RESPONSES TO PHQ QUESTIONS 1-9: 0

## 2025-01-09 ASSESSMENT — ENCOUNTER SYMPTOMS
SHORTNESS OF BREATH: 0
ABDOMINAL PAIN: 0

## 2025-01-09 NOTE — TELEPHONE ENCOUNTER
Patient called in requesting an order be placed for a Comprehensive Metabolic Panel.    Patient is also inquiring on if she can drop off her FMLA Paperwork to be filled out by Dr. Whitaker or if Dr. Whitaker is going to require another appointment to go over that paperwork.    Patient had a New Patient appointment on 01/09/2025.    Patient requesting a callback with provider decision.

## 2025-01-09 NOTE — PROGRESS NOTES
Subjective  Jacey Arias, 49 y.o. female presents today with:  Chief Complaint   Patient presents with    New Patient     Patient presents today to establish care.  She would like to have a EKG for a stomach tuck.     PHQ-9 Total Score: 0 (1/9/2025 10:29 AM)    Here to establish care.  She has a history of Crohn's disease and follows up with GI.  Follows up with OB/GYN for well woman exams.  Did have bariatric surgery last year.    Has a hx of asthma. Follows with pulm. On advair and albuterol PRN.     Will be getting a tummy tuck surgery - needs EKG and risk assessment done.     Review of Systems   Constitutional:  Negative for fatigue.   Respiratory:  Negative for shortness of breath.    Cardiovascular:  Negative for chest pain.   Gastrointestinal:  Negative for abdominal pain.       History reviewed. No pertinent past medical history.  History reviewed. No pertinent surgical history.  Current Outpatient Medications   Medication Sig Dispense Refill    albuterol sulfate HFA (PROVENTIL;VENTOLIN;PROAIR) 108 (90 Base) MCG/ACT inhaler Inhale 2 puffs into the lungs every 4 hours as needed      albuterol (PROVENTIL) (2.5 MG/3ML) 0.083% nebulizer solution Inhale 3 mLs into the lungs every 6 hours as needed      ALBUTEROL SULFATE HFA IN       cyanocobalamin 50 MCG tablet Take 1 tablet by mouth daily      cyanocobalamin 250 MCG tablet Take 1 tablet by mouth daily      fluticasone-salmeterol (ADVAIR) 250-50 MCG/ACT AEPB diskus inhaler Inhale 250 mcg into the lungs 2 times daily      Multiple Vitamin (MULTI-VITAMIN) TABS Take 1 tablet by mouth daily       No current facility-administered medications for this visit.     Allergies, PMH, Surgical Hx, Family Hx, and Social Hx reviewed and updated.    Objective    Vitals:    01/09/25 1033   BP: 122/80   Site: Left Upper Arm   Position: Sitting   Cuff Size: Medium Adult   Pulse: 78   Resp: 20   Temp: 97.7 °F (36.5 °C)   TempSrc: Temporal   SpO2: 97%   Weight: 89.5 kg (197 lb 6.4

## 2025-01-10 LAB — VITAMIN D 25-HYDROXY: 39.6 NG/ML (ref 30–100)

## 2025-01-10 NOTE — TELEPHONE ENCOUNTER
She had labs done 12/30/24 and BMP and LFTs (components of CMP) were already done at that time. She will need an appointment for paperwork completion.     Nehal Wihtaker MD  1/10/2025  9:41 AM

## 2025-01-10 NOTE — TELEPHONE ENCOUNTER
Notified patient appointment needed  to complete forms. She does have 2 upcoming appointment in future.

## 2025-01-11 LAB
EKG ATRIAL RATE: 73 BPM
EKG P AXIS: 63 DEGREES
EKG P-R INTERVAL: 140 MS
EKG Q-T INTERVAL: 414 MS
EKG QRS DURATION: 90 MS
EKG QTC CALCULATION (BAZETT): 456 MS
EKG R AXIS: 51 DEGREES
EKG T AXIS: 55 DEGREES
EKG VENTRICULAR RATE: 73 BPM

## 2025-01-13 ENCOUNTER — TELEPHONE (OUTPATIENT)
Age: 50
End: 2025-01-13

## 2025-01-17 ENCOUNTER — TELEPHONE (OUTPATIENT)
Age: 50
End: 2025-01-17

## 2025-01-17 ENCOUNTER — OFFICE VISIT (OUTPATIENT)
Age: 50
End: 2025-01-17
Payer: COMMERCIAL

## 2025-01-17 VITALS
SYSTOLIC BLOOD PRESSURE: 114 MMHG | BODY MASS INDEX: 31.93 KG/M2 | DIASTOLIC BLOOD PRESSURE: 86 MMHG | TEMPERATURE: 97.3 F | OXYGEN SATURATION: 99 % | HEART RATE: 82 BPM | HEIGHT: 66 IN | RESPIRATION RATE: 20 BRPM | WEIGHT: 198.7 LBS

## 2025-01-17 DIAGNOSIS — Z01.818 PREOPERATIVE EXAMINATION: Primary | ICD-10-CM

## 2025-01-17 PROCEDURE — 99215 OFFICE O/P EST HI 40 MIN: CPT | Performed by: STUDENT IN AN ORGANIZED HEALTH CARE EDUCATION/TRAINING PROGRAM

## 2025-01-17 SDOH — ECONOMIC STABILITY: FOOD INSECURITY: WITHIN THE PAST 12 MONTHS, THE FOOD YOU BOUGHT JUST DIDN'T LAST AND YOU DIDN'T HAVE MONEY TO GET MORE.: NEVER TRUE

## 2025-01-17 SDOH — ECONOMIC STABILITY: FOOD INSECURITY: WITHIN THE PAST 12 MONTHS, YOU WORRIED THAT YOUR FOOD WOULD RUN OUT BEFORE YOU GOT MONEY TO BUY MORE.: NEVER TRUE

## 2025-01-17 NOTE — PROGRESS NOTES
Subjective  Jacey Arias, 49 y.o. female presents today with:  Chief Complaint   Patient presents with    Other     Patient present today for [place] paperwork and pre-op.     She is here for [place] paperwork.  She came in last week and got surgical risk assessment done.  She is planning on getting an abdominoplasty done in [place].  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    Review of Systems   Constitutional:  Negative for fever.       No past medical history on file.  Past Surgical History:   Procedure Laterality Date    BARIATRIC SURGERY  2017     Current Outpatient Medications   Medication Sig Dispense Refill    albuterol sulfate HFA (PROVENTIL;VENTOLIN;PROAIR) 108 (90 Base) MCG/ACT inhaler Inhale 2 puffs into the lungs every 4 hours as needed      albuterol (PROVENTIL) (2.5 MG/3ML) 0.083% nebulizer solution Inhale 3 mLs into the lungs every 6 hours as needed      ALBUTEROL SULFATE HFA IN       cyanocobalamin 50 MCG tablet Take 1 tablet by mouth daily      cyanocobalamin 250 MCG tablet Take 1 tablet by mouth daily      fluticasone-salmeterol (ADVAIR) 250-50 MCG/ACT AEPB diskus inhaler Inhale 250 mcg into the lungs 2 times daily      Multiple Vitamin (MULTI-VITAMIN) TABS Take 1 tablet by mouth daily       No current facility-administered medications for this visit.     Allergies, PMH, Surgical Hx, Family Hx, and Social Hx reviewed and updated.    Objective    Vitals:    01/17/25 1325   BP: 114/86   Site: Left Upper Arm   Position: Sitting   Cuff Size: Medium Adult   Pulse: 82   Resp: 20   Temp: 97.3 °F (36.3 °C)   TempSrc: Temporal   SpO2: 99%   Weight: 90.1 kg (198 lb 11.2 oz)   Height: 1.676 m (5' 5.98\")       Physical Exam  Vitals reviewed.   Constitutional:       General: She is not in acute distress.  HENT:      Head: Normocephalic.   Pulmonary:      Effort: Pulmonary effort is normal. No respiratory distress.   Musculoskeletal:      Cervical back: Normal range of

## 2025-01-17 NOTE — TELEPHONE ENCOUNTER
I contacted the [facility] located to confirm that the patient does work at their facility and they confirmed that she does work there and the fax number is correct.

## 2025-01-17 NOTE — TELEPHONE ENCOUNTER
Please call patient to confirm that she works at the [facility] located at:    [address] [place] [address]    P. [phone] F. [phone]    Will fax [place] paperwork to them.  Some changes were made on her paperwork.    Nehal Whitaker MD  1/17/2025  2:27 PM